# Patient Record
(demographics unavailable — no encounter records)

---

## 2024-11-08 NOTE — PHYSICAL EXAM
[FreeTextEntry3] : Several scattered red partially blanching papules on the trunk and extremities Firm uniform brown papule with central hypopigmentation R lower leg No cervical, axillary, inguinal LAD Healing ulceration R knee Rhytids

## 2024-11-08 NOTE — HISTORY OF PRESENT ILLNESS
[FreeTextEntry1] : NP angiomas, poor wound healing  [de-identified] : NP Numerous angiomas appearing over body, dad had similar. Bothered by appearance  Concerned about poor wound healing, fell recently and area slow to heal

## 2024-11-08 NOTE — ASSESSMENT
[FreeTextEntry1] : #Wound on knee after patient fell -Ulceration, disc may scar, healing looks appropriate -Switch from neosporin to mupirocin until heals  #Angiomas -No LAD, cbc reviewed -Reassurance, discussed cosmetic PDL  #DF -Reassurance, RTC for any significant change  #Rhytids -Botox discussed including risks, benefits, OOP costs  RTC prn